# Patient Record
Sex: FEMALE | Race: WHITE | NOT HISPANIC OR LATINO
[De-identification: names, ages, dates, MRNs, and addresses within clinical notes are randomized per-mention and may not be internally consistent; named-entity substitution may affect disease eponyms.]

---

## 2021-05-07 ENCOUNTER — APPOINTMENT (OUTPATIENT)
Dept: PSYCHIATRY | Facility: CLINIC | Age: 31
End: 2021-05-07

## 2021-05-07 ENCOUNTER — OUTPATIENT (OUTPATIENT)
Dept: OUTPATIENT SERVICES | Facility: HOSPITAL | Age: 31
LOS: 1 days | Discharge: HOME | End: 2021-05-07

## 2021-05-07 PROBLEM — Z00.00 ENCOUNTER FOR PREVENTIVE HEALTH EXAMINATION: Status: ACTIVE | Noted: 2021-05-07

## 2021-06-01 ENCOUNTER — APPOINTMENT (OUTPATIENT)
Dept: PSYCHIATRY | Facility: CLINIC | Age: 31
End: 2021-06-01

## 2021-06-01 ENCOUNTER — OUTPATIENT (OUTPATIENT)
Dept: OUTPATIENT SERVICES | Facility: HOSPITAL | Age: 31
LOS: 1 days | Discharge: HOME | End: 2021-06-01

## 2021-06-01 DIAGNOSIS — F33.1 MAJOR DEPRESSIVE DISORDER, RECURRENT, MODERATE: ICD-10-CM

## 2021-06-14 ENCOUNTER — APPOINTMENT (OUTPATIENT)
Dept: PSYCHIATRY | Facility: CLINIC | Age: 31
End: 2021-06-14

## 2021-06-24 ENCOUNTER — APPOINTMENT (OUTPATIENT)
Dept: PSYCHIATRY | Facility: CLINIC | Age: 31
End: 2021-06-24

## 2021-11-15 ENCOUNTER — OUTPATIENT (OUTPATIENT)
Dept: OUTPATIENT SERVICES | Facility: HOSPITAL | Age: 31
LOS: 1 days | Discharge: HOME | End: 2021-11-15

## 2021-11-15 ENCOUNTER — APPOINTMENT (OUTPATIENT)
Dept: PSYCHIATRY | Facility: CLINIC | Age: 31
End: 2021-11-15
Payer: MEDICAID

## 2021-11-15 DIAGNOSIS — F33.1 MAJOR DEPRESSIVE DISORDER, RECURRENT, MODERATE: ICD-10-CM

## 2021-11-15 PROCEDURE — 99204 OFFICE O/P NEW MOD 45 MIN: CPT | Mod: 95

## 2022-01-14 ENCOUNTER — OUTPATIENT (OUTPATIENT)
Dept: OUTPATIENT SERVICES | Facility: HOSPITAL | Age: 32
LOS: 1 days | Discharge: HOME | End: 2022-01-14

## 2022-01-14 ENCOUNTER — APPOINTMENT (OUTPATIENT)
Dept: PSYCHIATRY | Facility: CLINIC | Age: 32
End: 2022-01-14
Payer: MEDICAID

## 2022-01-14 DIAGNOSIS — F33.1 MAJOR DEPRESSIVE DISORDER, RECURRENT, MODERATE: ICD-10-CM

## 2022-01-14 PROCEDURE — 99213 OFFICE O/P EST LOW 20 MIN: CPT | Mod: 95

## 2022-03-09 ENCOUNTER — APPOINTMENT (OUTPATIENT)
Dept: PSYCHIATRY | Facility: CLINIC | Age: 32
End: 2022-03-09

## 2022-05-23 ENCOUNTER — APPOINTMENT (OUTPATIENT)
Dept: PSYCHIATRY | Facility: CLINIC | Age: 32
End: 2022-05-23

## 2022-05-23 ENCOUNTER — OUTPATIENT (OUTPATIENT)
Dept: OUTPATIENT SERVICES | Facility: HOSPITAL | Age: 32
LOS: 1 days | Discharge: HOME | End: 2022-05-23

## 2022-05-23 DIAGNOSIS — F33.42 MAJOR DEPRESSIVE DISORDER, RECURRENT, IN FULL REMISSION: ICD-10-CM

## 2022-05-23 DIAGNOSIS — F41.1 GENERALIZED ANXIETY DISORDER: ICD-10-CM

## 2022-05-23 PROCEDURE — 99213 OFFICE O/P EST LOW 20 MIN: CPT | Mod: 95

## 2022-08-09 ENCOUNTER — OUTPATIENT (OUTPATIENT)
Dept: OUTPATIENT SERVICES | Facility: HOSPITAL | Age: 32
LOS: 1 days | Discharge: HOME | End: 2022-08-09

## 2022-08-09 ENCOUNTER — APPOINTMENT (OUTPATIENT)
Dept: PSYCHIATRY | Facility: CLINIC | Age: 32
End: 2022-08-09

## 2022-08-09 DIAGNOSIS — F33.42 MAJOR DEPRESSIVE DISORDER, RECURRENT, IN FULL REMISSION: ICD-10-CM

## 2022-08-09 DIAGNOSIS — F41.1 GENERALIZED ANXIETY DISORDER: ICD-10-CM

## 2022-08-09 PROCEDURE — 99213 OFFICE O/P EST LOW 20 MIN: CPT | Mod: 95

## 2022-10-28 ENCOUNTER — NON-APPOINTMENT (OUTPATIENT)
Age: 32
End: 2022-10-28

## 2022-10-28 NOTE — DISCUSSION/SUMMARY
[FreeTextEntry1] : Reached out to patient , to inform her that this writer is leaving the clinic . PAtient is stable, highly functioning and doing well in terms of her anxiety and depressive symptoms. Refills were sent. \par  PAtient will be transferred to another Russian speaking MD

## 2022-12-05 ENCOUNTER — APPOINTMENT (OUTPATIENT)
Dept: PSYCHIATRY | Facility: CLINIC | Age: 32
End: 2022-12-05

## 2022-12-05 NOTE — DISCUSSION/SUMMARY
[FreeTextEntry1] : Pt is a no show. I called her several times and left 2 VM. Pt called our  prior  to her appt and requested a phone session (though she never responded to my calls)

## 2022-12-15 ENCOUNTER — APPOINTMENT (OUTPATIENT)
Dept: PSYCHIATRY | Facility: CLINIC | Age: 32
End: 2022-12-15

## 2022-12-20 ENCOUNTER — OUTPATIENT (OUTPATIENT)
Dept: OUTPATIENT SERVICES | Facility: HOSPITAL | Age: 32
LOS: 1 days | Discharge: HOME | End: 2022-12-20

## 2022-12-20 ENCOUNTER — APPOINTMENT (OUTPATIENT)
Dept: PSYCHIATRY | Facility: CLINIC | Age: 32
End: 2022-12-20

## 2022-12-20 ENCOUNTER — TRANSCRIPTION ENCOUNTER (OUTPATIENT)
Age: 32
End: 2022-12-20

## 2022-12-20 DIAGNOSIS — F41.1 GENERALIZED ANXIETY DISORDER: ICD-10-CM

## 2022-12-20 DIAGNOSIS — F33.42 MAJOR DEPRESSIVE DISORDER, RECURRENT, IN FULL REMISSION: ICD-10-CM

## 2022-12-20 PROCEDURE — 99214 OFFICE O/P EST MOD 30 MIN: CPT | Mod: 95

## 2022-12-20 NOTE — PHYSICAL EXAM
[Well groomed] : well groomed [Cooperative] : cooperative [Anxious] : anxious [Full] : full [Clear] : clear [Linear/Goal Directed] : linear/goal directed [None] : none [None Reported] : none reported [Average] : average [WNL] : within normal limits

## 2022-12-20 NOTE — CURRENT PSYCHIATRIC SYMPTOMS
[Depressed Mood] : depressed mood [Anhedonia] : anhedonia [Guilt] : not feeling guilty [Decreased Concentration] : decreased concentrating ability [Hyperphagia] : no hyperphagia [Insomnia] : no insomnia disorder [Hypersomnia] : no ~T hypersomnia [Psychomotor Retardation] : no psychomotor retardation [Anorexia] : no anorexia [Euphoria] : no euphoria [Highly Irritable] : no high irritability [Increased Activity] : no increased in activity [Distractibility] : not distracted [Talkativeness] : no talkativeness [Grandeur] : no feelings of grandeur [Buying Sprees] : no buying sprees [Hypersexuality] : denied hypersexuality [Dec Need For Sleep] : no decreased need for sleep [Delusions] : no ~T delusions [Hallucination Auditory] : no auditory hallucinations [Thought Disorder] : ~T a thought disorder was not noted [Excessive Worry] : excessive worry [Ruminations] : ruminations [Obsessions] : no obsessions [Re-experiencing] : no re-experiencing [Restlessness] : restlessness [Hypochondriasis] : no hypochondriasis [Panic] : panic  [Recent Onset] : no recent onset of confusion [Fluctuating Course] : no fluctuating course [Reversed sleep-wake] : no reversed sleep- wake [Inattentive] : not nattentive [Tremor] : ~T no ~M tremor was seen [Anxiety] : no anxiety [Hallucination Visual] : no visual hallucinations [Hallucination Olfactory] : no olfactory hallucinations [Nausea] : no nausea [Hallucination Tactile] : no tactile hallucinations [Hallucination Gustatory] : no gustatory hallucinations [Diaphoresis] : showed no ~M diaphoresis [Vomiting] : no vomiting [Yawning] : no yawning [Mydriasis] : showed no ~M mydriasis [Gastrointestinal Sxs] : no ~T gastrointestinal symptoms [Piloerection] : the hair did not demonstrate piloerection [Rhinorrhea] : no ~M rhinorrhea discharge was seen

## 2022-12-20 NOTE — PAST MEDICAL HISTORY
[FreeTextEntry1] : Patient is a 30 year old, domiciled, employed, , female from [facility] who reports history of depression and anxiety dating back to age 11.  At this time, she admits to depressive characterized by uncontrollable tearful episodes and decreased appetite.  Parents sought outpatient mental health treatment with a psychologist for a year with positive effect.  She did not receive additional treatment until age 18 when she was psychiatrically hospitalized due to anxiety and depressive symptoms in context of Bird flu pandemic.  Did not receive any other treatment until 2017 when she received outpatient treatment at [facility].  Denied history of suicide/homicide attempts.  Acknowledged 2 to 3 incidents of self-injurious behavior by way of cutting wrist at age [age].  Also denied history of psychosis and acts of violence.

## 2022-12-20 NOTE — SOCIAL HISTORY
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 \par [X] No \par [ ] Yes - Details: [ ]  \par \par  \par \par Legal Involvement history  \par Does the individual have a history of or current involvement with the legal system?  \par [X] No \par [ ] Yes - Details: [ ]  \par \par  \par \par  Services  \par Have you ever served in the ?  \par [ X ] No \par [ ] Yes - Details: [ ]  \par \par  \par Spiritual Assessment Tool - FICA \par F. What is your gigi or belief? Patient believes in energy.  “Something exists, but we don’t know” \par \par Do you consider yourself spiritual or Sabianism? Spiritual  \par Is there something you believe in that gives meaning to your life? "Belief in success” \par \par I: Is it important in your life? Yes, very \par What influence does it have on how you take care of yourself? Drives her to engage in activities to be successful   \par \par How have your beliefs influenced your behavior during this illness? What role do your beliefs play in regaining your health? She does not believe it has  \par \par C. Are you part of a spiritual or Sabianism community? No  \par Is this of support to you and how?  N/A \par \par Is there a person or group of people you really love or who are really important to you? Boyfriend and family  \par \par H. We have been discussing your belief and supports. What else gives you internal support?  “When someone loves me unconditionally” \par \par What are your sources of hope, strength, comfort and peace?  Love from others \par \par What do you hold on to during difficult times? Thoughts that things can be better  \par \par What sustains you and keeps you going? Hope  \par \par A. How would you like me, your healthcare provider, to address these issues in your healthcare? Does not want it addressed  \par \par   [Yes] : yes [None Known] : none known [No Known Use] : no known use [TextBox_7] : Patient reports she drinks 1-2 days a week where she consumes 1-2 glasses of wine with friends.  Denied problematic  [TextBox_9] : Denied [TextBox_11] : Denied

## 2022-12-20 NOTE — DISCUSSION/SUMMARY
[FreeTextEntry1] : This was our 1st session after dr. Orellana's departure.\par Solo/tele visit.\par Pt says  that she is doing fine. She takes 12.5 mg of lexapro and doesn't take klonopin.\par Pt is  and currently dating a skinny (for 2 m). She doesn't use any protection and wants to get pregnant (d/k when it will happen). Pt asked a lot of questions about her meds and pregnancy.\par Risks and benefits of lexapro were d/w pt in details. She will continue lexapro for as long as it will take to get pregnant.\par She will possibly stop it when she will get pregnant and will continue only with therapy and close observation. Pt is currently in therapy with a private therapist. I told her that she will need to switch to our therapist when she will stop her meds or we will have to close her case. Pt is agreeable. Denies SI/HI/AH/PI. Sleep and appetite are OK. Pt got Green card.\par \par Pt is 31 yo F, domiciled with roommates,  (was  for 3 y), currently dating, no kids, employed (film industry), came from [facility] at age 25 (parents are in [facility]), with h/o depression and anxiety, 1 prior voluntary IPP at age 18 (in [facility]), no prior SA, no GEORGE\par \par 1. Next appt in 1m tele\par 2. Continue 12.5mg of lexapro\par 3. No labs in the chart (there were no meds too for some reason). She says that she had labs done 1 y ago at her PCP and the results were faxed to dr. Orellana. Refer for labs \par 4. Pt didn't tell me, but dr. Orellana writes about h/o SIB. Get more information\par 5. Pt wants to get pregnant

## 2022-12-20 NOTE — REASON FOR VISIT
[Follow-Up Visit] : a follow-up visit [Home] : at home, [unfilled] , at the time of the visit. [Medical Office: ([facility])___] : at the medical office located in  [Evaluation] : evaluation of [FreeTextEntry1] : Depression and anxiety  [FreeTextEntry2] : Self

## 2022-12-20 NOTE — HISTORY OF PRESENT ILLNESS
[FreeTextEntry3] : This was our 1st session after dr. Orellana's departure.\par Solo/tele visit.\par Pt says  that she is doing fine. She takes 12.5 mg of lexapro and doesn't take klonopin.\par Pt is  and currently dating a skinny (for 2 m). She doesn't use any protection and wants to get pregnant (d/k when it will happen). Pt asked a lot of questions about her meds and pregnancy.\par Risks and benefits of lexapro were d/w pt in details. She will continue lexapro for as long as it will take to get pregnant.\par She will possibly stop it when she will get pregnant and will continue only with therapy and close observation. Pt is currently in therapy with a private therapist. I told her that she will need to switch to our therapist when she will stop her meds or we will have to close her case. Pt is agreeable. Denies SI/HI/AH/PI. Sleep and appetite are OK. Pt got Green card. [de-identified] : This was our 1st session after dr. Orellana's departure.\par Solo/tele visit.\par Pt says  that she is doing fine. She takes 12.5 mg of lexapro and doesn't take klonopin.\par Pt is  and currently dating a skinny (for 2 m). She doesn't use any protection and wants to get pregnant (d/k when it will happen). Pt asked a lot of questions about her meds and pregnancy.\par Risks and benefits of lexapro were d/w pt in details. She will continue lexapro for as long as it will take to get pregnant.\par She will possibly stop it when she will get pregnant and will continue only with therapy and close observation. Pt is currently in therapy with a private therapist. I told her that she will need to switch to our therapist when she will stop her meds or we will have to close her case. Pt is agreeable. Denies SI/HI/AH/PI. Sleep and appetite are OK. Pt got Green card. [Yes] : yes [No] : no [TextBox_32] : Patient reports times where she wished she was dead and wanted it to happen accidentally.  However, she denied intent to harm (denied ability "to do it myself").   [Yes > 3 months ago] : yes, > 3 months ago [Agitation/ severe anxiety] : agitation/severe anxiety [Anhedonia] : anhedonia [Recent loss] : recent loss [Other___] : [unfilled] [Identifies reasons for living] : identifies reasons for living [Future oriented] : future oriented [Fear of death or dying due to pain/suffering] : fear of death or dying due to pain/suffering [TextBox_52] : Patient reports at age [age] she engaged in self-injurious behavior of cutting wrist 2-3 times.  Denied intent to kill herself, but to only hurt and release stress [None Known] : none known [Engagement in treatment] : engagement in treatment

## 2023-03-14 ENCOUNTER — APPOINTMENT (OUTPATIENT)
Dept: PSYCHIATRY | Facility: CLINIC | Age: 33
End: 2023-03-14
Payer: MEDICAID

## 2023-03-14 ENCOUNTER — OUTPATIENT (OUTPATIENT)
Dept: OUTPATIENT SERVICES | Facility: HOSPITAL | Age: 33
LOS: 1 days | End: 2023-03-14
Payer: MEDICAID

## 2023-03-14 DIAGNOSIS — F33.1 MAJOR DEPRESSIVE DISORDER, RECURRENT, MODERATE: ICD-10-CM

## 2023-03-14 DIAGNOSIS — F41.1 GENERALIZED ANXIETY DISORDER: ICD-10-CM

## 2023-03-14 PROCEDURE — 99214 OFFICE O/P EST MOD 30 MIN: CPT | Mod: 95

## 2023-03-14 NOTE — DISCUSSION/SUMMARY
[FreeTextEntry1] : Solo/tele visit.\par Pt says  that she is doing fine now because she recently came back from [facility]. However, she tried to decrease lexapro to 5-10mg and felt very anxious. Pt started feeling better after she increased lexapro back to 12.5 mg. She feels that this dose is optimal for her (a higher dose causes severe drowsiness). She takes 12.5 mg of lexapro and doesn't take klonopin.\par Pt is  and currently dating a skinny (for 3 m). 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 Pt again asked a lot of questions about her meds and pregnancy. She is concerned that she tried to wean herself off lexapro and felt so bad. We discussed treatment options for her in the event that she becomes pregnant (therapy was one of them). Risks of meds for a fetus were discussed in details.\par Risks and benefits of lexapro were d/w pt too in details. She will continue lexapro for as long as it will take to get pregnant, and then she will decide if she wants to continue or  to stop it.\par She will possibly stop it when she will get pregnant and will continue only with therapy and close observation. Pt is currently in therapy with a private therapist. I told her that she will need to switch to our therapist when she will stop her meds or we will have to close her case. Pt is agreeable. Denies SI/HI/AH/PI. Sleep and appetite are OK. 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 Next appt in 1m tele\par 2. Continue 12.5mg of lexapro\par 3. No labs in the chart (there were no meds too for some reason). She says that she had labs done 1 y ago at her PCP and the results were faxed to dr. Orellana. Refer for labs \par 4. Pt didn't tell me, but dr. Orellana writes about h/o SIB. Get more information\par 5. Pt wants to get pregnant and her treatment options were d/w her in details (she tried to decrease lexapro but felt very anxious).\par 6. How her trip to [facility] went (to film a documentary)

## 2023-03-14 NOTE — CURRENT PSYCHIATRIC SYMPTOMS
[Anhedonia] : anhedonia [Depressed Mood] : depressed mood [Guilt] : not feeling guilty [Decreased Concentration] : decreased concentrating ability [Hyperphagia] : no hyperphagia [Insomnia] : no insomnia disorder [Hypersomnia] : no ~T hypersomnia [Psychomotor Retardation] : no psychomotor retardation [Anorexia] : no anorexia [Euphoria] : no euphoria [Highly Irritable] : no high irritability [Increased Activity] : no increased in activity [Distractibility] : not distracted [Talkativeness] : no talkativeness [Grandeur] : no feelings of grandeur [Buying Sprees] : no buying sprees [Hypersexuality] : denied hypersexuality [Dec Need For Sleep] : no decreased need for sleep [Delusions] : no ~T delusions [Hallucination Auditory] : no auditory hallucinations [Thought Disorder] : ~T a thought disorder was not noted [Excessive Worry] : excessive worry [Ruminations] : ruminations [Obsessions] : no obsessions [Re-experiencing] : no re-experiencing [Restlessness] : restlessness [Hypochondriasis] : no hypochondriasis [Panic] : panic  [Recent Onset] : no recent onset of confusion [Fluctuating Course] : no fluctuating course [Reversed sleep-wake] : no reversed sleep- wake [Inattentive] : not nattentive [Tremor] : ~T no ~M tremor was seen [Anxiety] : no anxiety [Hallucination Visual] : no visual hallucinations [Hallucination Olfactory] : no olfactory hallucinations [Nausea] : no nausea [Hallucination Tactile] : no tactile hallucinations [Hallucination Gustatory] : no gustatory hallucinations [Diaphoresis] : showed no ~M diaphoresis [Vomiting] : no vomiting [Yawning] : no yawning [Mydriasis] : showed no ~M mydriasis [Gastrointestinal Sxs] : no ~T gastrointestinal symptoms [Piloerection] : the hair did not demonstrate piloerection [Rhinorrhea] : no ~M rhinorrhea discharge was seen

## 2023-03-14 NOTE — SOCIAL HISTORY
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 \par [X] No \par [ ] Yes - Details: [ ]  \par \par  \par \par Legal Involvement history  \par Does the individual have a history of or current involvement with the legal system?  \par [X] No \par [ ] Yes - Details: [ ]  \par \par  \par \par  Services  \par Have you ever served in the ?  \par [ X ] No \par [ ] Yes - Details: [ ]  \par \par  \par Spiritual Assessment Tool - FICA \par F. What is your gigi or belief? Patient believes in energy.  “Something exists, but we don’t know” \par \par Do you consider yourself spiritual or Gnosticism? Spiritual  \par Is there something you believe in that gives meaning to your life? "Belief in success” \par \par I: Is it important in your life? Yes, very \par What influence does it have on how you take care of yourself? Drives her to engage in activities to be successful   \par \par How have your beliefs influenced your behavior during this illness? What role do your beliefs play in regaining your health? She does not believe it has  \par \par C. Are you part of a spiritual or Gnosticism community? No  \par Is this of support to you and how?  N/A \par \par Is there a person or group of people you really love or who are really important to you? Boyfriend and family  \par \par H. We have been discussing your belief and supports. What else gives you internal support?  “When someone loves me unconditionally” \par \par What are your sources of hope, strength, comfort and peace?  Love from others \par \par What do you hold on to during difficult times? Thoughts that things can be better  \par \par What sustains you and keeps you going? Hope  \par \par A. How would you like me, your healthcare provider, to address these issues in your healthcare? Does not want it addressed  \par \par   [Yes] : yes [None Known] : none known [No Known Use] : no known use [TextBox_7] : Patient reports she drinks 1-2 days a week where she consumes 1-2 glasses of wine with friends.  Denied problematic  [TextBox_9] : Denied [TextBox_11] : Denied

## 2023-03-14 NOTE — REASON FOR VISIT
[Follow-Up Visit] : a follow-up visit [Home] : at home, [unfilled] , at the time of the visit. [Medical Office: ([facility])___] : at the medical office located in  [Evaluation] : evaluation of [FreeTextEntry1] : Depression and anxiety  [FreeTextEntry2] : Self

## 2023-03-14 NOTE — PAST MEDICAL HISTORY
[FreeTextEntry1] : Patient is a 30 year old, domiciled, employed, , female from [facility] who reports history of depression and anxiety dating back to age 11.  At this time, she admits to depressive characterized by uncontrollable tearful episodes and decreased appetite.  Parents sought outpatient mental health treatment with a psychologist for a year with positive effect.  She did not receive additional treatment until age 18 when she was psychiatrically hospitalized due to anxiety and depressive symptoms in context of Bird flu pandemic.  Did not receive any other treatment until 2017 when she received outpatient treatment at [facility].  Denied history of suicide/homicide attempts.  Acknowledged 2 to 3 incidents of self-injurious behavior by way of cutting wrist at age [age].  Also denied history of psychosis and acts of violence.

## 2023-03-14 NOTE — HISTORY OF PRESENT ILLNESS
[FreeTextEntry3] : Solo/tele visit.\par Pt says  that she is doing fine now because she recently came back from [facility]. However, she tried to decrease lexapro to 5-10mg and felt very anxious. Pt started feeling better after she increased lexapro back to 12.5 mg. She feels that this dose is optimal for her (a higher dose causes severe drowsiness). She takes 12.5 mg of lexapro and doesn't take klonopin.\par Pt is  and currently dating a skinny (for 3 m). 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 Pt again asked a lot of questions about her meds and pregnancy. She is concerned that she tried to wean herself off lexapro and felt so bad. We discussed treatment options for her in the event that she becomes pregnant (therapy was one of them). Risks of meds for a fetus were discussed in details.\par Risks and benefits of lexapro were d/w pt too in details. She will continue lexapro for as long as it will take to get pregnant, and then she will decide if she wants to continue or  to stop it.\par She will possibly stop it when she will get pregnant and will continue only with therapy and close observation. Pt is currently in therapy with a private therapist. I told her that she will need to switch to our therapist when she will stop her meds or we will have to close her case. Pt is agreeable. Denies SI/HI/AH/PI. Sleep and appetite are OK. Pt got Green card. [de-identified] : This was our 1st session after dr. Orellana's departure.\par Solo/tele visit.\par Pt says  that she is doing fine. She takes 12.5 mg of lexapro and doesn't take klonopin.\par Pt is  and currently dating a skinny (for 2 m). She doesn't use any protection and wants to get pregnant (d/k when it will happen). Pt asked a lot of questions about her meds and pregnancy.\par Risks and benefits of lexapro were d/w pt in details. She will continue lexapro for as long as it will take to get pregnant.\par She will possibly stop it when she will get pregnant and will continue only with therapy and close observation. Pt is currently in therapy with a private therapist. I told her that she will need to switch to our therapist when she will stop her meds or we will have to close her case. Pt is agreeable. Denies SI/HI/AH/PI. Sleep and appetite are OK. Pt got Green card. [Yes] : yes [No] : no [TextBox_32] : Patient reports times where she wished she was dead and wanted it to happen accidentally.  However, she denied intent to harm (denied ability "to do it myself").   [Yes > 3 months ago] : yes, > 3 months ago [Agitation/ severe anxiety] : agitation/severe anxiety [Anhedonia] : anhedonia [Recent loss] : recent loss [Other___] : [unfilled] [Identifies reasons for living] : identifies reasons for living [Future oriented] : future oriented [Fear of death or dying due to pain/suffering] : fear of death or dying due to pain/suffering [TextBox_52] : Patient reports at age [age] she engaged in self-injurious behavior of cutting wrist 2-3 times.  Denied intent to kill herself, but to only hurt and release stress [None Known] : none known [Engagement in treatment] : engagement in treatment

## 2023-03-15 DIAGNOSIS — F33.1 MAJOR DEPRESSIVE DISORDER, RECURRENT, MODERATE: ICD-10-CM

## 2023-03-15 DIAGNOSIS — F41.1 GENERALIZED ANXIETY DISORDER: ICD-10-CM

## 2023-04-20 ENCOUNTER — APPOINTMENT (OUTPATIENT)
Dept: PSYCHIATRY | Facility: CLINIC | Age: 33
End: 2023-04-20
Payer: MEDICAID

## 2023-04-20 ENCOUNTER — OUTPATIENT (OUTPATIENT)
Dept: OUTPATIENT SERVICES | Facility: HOSPITAL | Age: 33
LOS: 1 days | End: 2023-04-20
Payer: MEDICAID

## 2023-04-20 DIAGNOSIS — F33.1 MAJOR DEPRESSIVE DISORDER, RECURRENT, MODERATE: ICD-10-CM

## 2023-04-20 PROCEDURE — 99214 OFFICE O/P EST MOD 30 MIN: CPT | Mod: 95

## 2023-04-20 NOTE — CURRENT PSYCHIATRIC SYMPTOMS
[Depressed Mood] : depressed mood [Anhedonia] : anhedonia [Guilt] : not feeling guilty [Decreased Concentration] : decreased concentrating ability [Hyperphagia] : no hyperphagia [Insomnia] : no insomnia disorder [Hypersomnia] : no ~T hypersomnia [Psychomotor Retardation] : no psychomotor retardation [Anorexia] : no anorexia [Euphoria] : no euphoria [Highly Irritable] : no high irritability [Increased Activity] : no increased in activity [Distractibility] : not distracted [Talkativeness] : no talkativeness [Grandeur] : no feelings of grandeur [Buying Sprees] : no buying sprees [Hypersexuality] : denied hypersexuality [Dec Need For Sleep] : no decreased need for sleep [Delusions] : no ~T delusions [Hallucination Auditory] : no auditory hallucinations [Thought Disorder] : ~T a thought disorder was not noted [Excessive Worry] : excessive worry [Ruminations] : ruminations [Obsessions] : no obsessions [Re-experiencing] : no re-experiencing [Restlessness] : restlessness [Hypochondriasis] : no hypochondriasis [Panic] : panic  [Recent Onset] : no recent onset of confusion [Fluctuating Course] : no fluctuating course [Inattentive] : not nattentive [Reversed sleep-wake] : no reversed sleep- wake [Tremor] : ~T no ~M tremor was seen [Anxiety] : no anxiety [Hallucination Visual] : no visual hallucinations [Hallucination Olfactory] : no olfactory hallucinations [Nausea] : no nausea [Hallucination Tactile] : no tactile hallucinations [Hallucination Gustatory] : no gustatory hallucinations [Diaphoresis] : showed no ~M diaphoresis [Vomiting] : no vomiting [Yawning] : no yawning [Gastrointestinal Sxs] : no ~T gastrointestinal symptoms [Mydriasis] : showed no ~M mydriasis [Piloerection] : the hair did not demonstrate piloerection [Rhinorrhea] : no ~M rhinorrhea discharge was seen

## 2023-04-20 NOTE — DISCUSSION/SUMMARY
[FreeTextEntry1] : Solo/tele visit.\rafa Pt says  that she is doing fine now because she recently came back from [facility] and also because  she is very busy. Works a lot.. She tried to decrease lexapro to 5-10mg and felt very anxious. Pt started feeling better after she increased lexapro back to 12.5 mg. She feels that this dose is optimal for her (a higher dose causes severe drowsiness). She takes 12.5 mg of lexapro and doesn't take klonopin.\rafa Pt continues to try to get pregnant. Her OB said that it can take up to 1 h. \rafa Pt is  and currently dating a skinny (for 3 m). \par Pt doesn't use any protection and wants to get pregnant (d/k when it will happen). Pt again asked a lot of questions about her meds and pregnancy. She is concerned that she tried to wean herself off lexapro and felt so bad. We discussed treatment options for her in the event that she becomes pregnant (therapy was one of them). Risks of meds for a fetus were discussed in details.\par She leans toward continuing lexapro during her future pregnancy.\rafa  Pt is currently in therapy with a private therapist. I told her that she will need to switch to our therapist when she will stop her meds or we will have to close her case. I also told her to ask her PCP if he agrees to continue to prescribe lexapro. Pt is agreeable. Denies SI/HI/AH/PI. Sleep and appetite are OK.\rafa Pt had labs done 2 weeks ago and she will see her PCP in 1 week. 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 Next appt in 2m tele\par 2. Continue 12.5mg of lexapro\par 3. Labs results (PCP. I gave our fax #)\par 4. Did she speak to her PCP regarding having lexapro prescribed.\par 5. Pt wants to get pregnant and her treatment options were d/w her in details (she tried to decrease lexapro but felt very anxious).\par

## 2023-04-20 NOTE — PAST MEDICAL HISTORY
[FreeTextEntry1] : Patient is a 30 year old, domiciled, employed, , female from [facility] who reports history of depression and anxiety dating back to age 11.  At this time, she admits to depressive characterized by uncontrollable tearful episodes and decreased appetite.  Parents sought outpatient mental health treatment with a psychologist for a year with positive effect.  She did not receive additional treatment until age 18 when she was psychiatrically hospitalized due to anxiety and depressive symptoms in context of Bird flu pandemic.  Did not receive any other treatment until 2017 when she received outpatient treatment at [facility].  Denied history of suicide/homicide attempts.  Acknowledged 2 to 3 incidents of self-injurious behavior by way of cutting wrist at age [age].  Also denied history of psychosis and acts of violence.

## 2023-04-20 NOTE — SOCIAL HISTORY
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 \par [X] No \par [ ] Yes - Details: [ ]  \par \par  \par \par Legal Involvement history  \par Does the individual have a history of or current involvement with the legal system?  \par [X] No \par [ ] Yes - Details: [ ]  \par \par  \par \par  Services  \par Have you ever served in the ?  \par [ X ] No \par [ ] Yes - Details: [ ]  \par \par  \par Spiritual Assessment Tool - FICA \par F. What is your gigi or belief? Patient believes in energy.  “Something exists, but we don’t know” \par \par Do you consider yourself spiritual or Methodist? Spiritual  \par Is there something you believe in that gives meaning to your life? "Belief in success” \par \par I: Is it important in your life? Yes, very \par What influence does it have on how you take care of yourself? Drives her to engage in activities to be successful   \par \par How have your beliefs influenced your behavior during this illness? What role do your beliefs play in regaining your health? She does not believe it has  \par \par C. Are you part of a spiritual or Methodist community? No  \par Is this of support to you and how?  N/A \par \par Is there a person or group of people you really love or who are really important to you? Boyfriend and family  \par \par H. We have been discussing your belief and supports. What else gives you internal support?  “When someone loves me unconditionally” \par \par What are your sources of hope, strength, comfort and peace?  Love from others \par \par What do you hold on to during difficult times? Thoughts that things can be better  \par \par What sustains you and keeps you going? Hope  \par \par A. How would you like me, your healthcare provider, to address these issues in your healthcare? Does not want it addressed  \par \par   [Yes] : yes [None Known] : none known [No Known Use] : no known use [TextBox_7] : Patient reports she drinks 1-2 days a week where she consumes 1-2 glasses of wine with friends.  Denied problematic  [TextBox_9] : Denied [TextBox_11] : Denied

## 2023-04-20 NOTE — REASON FOR VISIT
[Follow-Up Visit] : a follow-up visit [Home] : at home, [unfilled] , at the time of the visit. [Medical Office: ([facility])___] : at the medical office located in  [Evaluation] : evaluation of [FreeTextEntry2] : Self [FreeTextEntry1] : Depression and anxiety

## 2023-04-20 NOTE — HISTORY OF PRESENT ILLNESS
[FreeTextEntry3] : Solo/tele visit.\rafa Pt says  that she is doing fine now because she recently came back from [facility] and also because  she is very busy. Works a lot.. She tried to decrease lexapro to 5-10mg and felt very anxious. Pt started feeling better after she increased lexapro back to 12.5 mg. She feels that this dose is optimal for her (a higher dose causes severe drowsiness). She takes 12.5 mg of lexapro and doesn't take klonopin.\rafa Pt continues to try to get pregnant. Her OB said that it can take up to 1 h. \rafa Pt is  and currently dating a skinny (for 3 m). \rafa Pt doesn't use any protection and wants to get pregnant (d/k when it will happen). Pt again asked a lot of questions about her meds and pregnancy. She is concerned that she tried to wean herself off lexapro and felt so bad. We discussed treatment options for her in the event that she becomes pregnant (therapy was one of them). Risks of meds for a fetus were discussed in details.\par She leans toward continuing lexapro during her future pregnancy.\rafa  Pt is currently in therapy with a private therapist. I told her that she will need to switch to our therapist when she will stop her meds or we will have to close her case. I also told her to ask her PCP if he agrees to continue to prescribe lexapro. Pt is agreeable. Denies SI/HI/AH/PI. Sleep and appetite are OK.\rafa Pt had labs done 2 weeks ago and she will see her PCP in 1 week. I gave her our fax # [de-identified] : This was our 1st session after dr. Orellana's departure.\par Solo/tele visit.\par Pt says  that she is doing fine. She takes 12.5 mg of lexapro and doesn't take klonopin.\par Pt is  and currently dating a skinny (for 2 m). She doesn't use any protection and wants to get pregnant (d/k when it will happen). Pt asked a lot of questions about her meds and pregnancy.\par Risks and benefits of lexapro were d/w pt in details. She will continue lexapro for as long as it will take to get pregnant.\par She will possibly stop it when she will get pregnant and will continue only with therapy and close observation. Pt is currently in therapy with a private therapist. I told her that she will need to switch to our therapist when she will stop her meds or we will have to close her case. Pt is agreeable. Denies SI/HI/AH/PI. Sleep and appetite are OK. Pt got Green card. [Yes] : yes [No] : no [TextBox_32] : Patient reports times where she wished she was dead and wanted it to happen accidentally.  However, she denied intent to harm (denied ability "to do it myself").   [Yes > 3 months ago] : yes, > 3 months ago [Agitation/ severe anxiety] : agitation/severe anxiety [Anhedonia] : anhedonia [Recent loss] : recent loss [Other___] : [unfilled] [Identifies reasons for living] : identifies reasons for living [Future oriented] : future oriented [Fear of death or dying due to pain/suffering] : fear of death or dying due to pain/suffering [TextBox_52] : Patient reports at age [age] she engaged in self-injurious behavior of cutting wrist 2-3 times.  Denied intent to kill herself, but to only hurt and release stress [None Known] : none known [Engagement in treatment] : engagement in treatment

## 2023-04-21 DIAGNOSIS — F33.1 MAJOR DEPRESSIVE DISORDER, RECURRENT, MODERATE: ICD-10-CM

## 2023-05-18 ENCOUNTER — NON-APPOINTMENT (OUTPATIENT)
Age: 33
End: 2023-05-18

## 2023-05-24 ENCOUNTER — NON-APPOINTMENT (OUTPATIENT)
Age: 33
End: 2023-05-24

## 2023-05-24 NOTE — DISCUSSION/SUMMARY
[Plan Review] : Plan Review [Able to manage surrounding demands and opportunities] : able to manage surrounding demands and opportunities [Able to set and pursue goals] : able to set and pursue goals [Attempting to realize their potential] : Attempting to realize their potential [Cognitively intact] : cognitively intact [Creative] : creative [Intelligent] : intelligent [Motivated to participate in treatment] : motivated to participate in treatment [Motivated to maintain or improve physical health] : motivated to maintain or improve physical health [FreeTextEntry1] : 01/15/23 [FreeTextEntry2] : 01/15/24 [FreeTextEntry5] : Pt is in need of continuing  maintenance treatment for her DAMEON and recurrent MDD to prevent relapse \par \par Continue to experience therapeutic gains due to compliance with visits and medications \par  [Mental Health] : Mental Health [Continued - Progress made] : Continued - Progress made: [every ___ weeks] : every [unfilled] weeks [Improvement in symptoms as evidenced by:] : Improvement in symptoms as evidenced by: [Attainment of higher level of functioning as evidenced by:] : Attainment of higher level of functioning as evidenced by: [Treatment is no longer medically necessary as evidenced by:] : Treatment is no longer medically necessary as evidenced by: [Yes] : Yes [Psychiatric Provider/Prescriber] : Psychiatric Provider/Prescriber [FreeTextEntry4] : Not to feel depressed and anxious [de-identified] : pt is stable on 12.5mg of lexapro [de-identified] : compliance with meds and F/U [de-identified] : no symptoms of anxiety and depression [de-identified] : pt is in remission [Potential impact of patient’s physical health conditions on psychiatric care?] : Potential impact of patient’s physical health conditions on psychiatric care: No [Does patient require any additional health services or referrals?] : Does patient require any additional health services or referrals: No

## 2023-05-30 ENCOUNTER — APPOINTMENT (OUTPATIENT)
Dept: PSYCHIATRY | Facility: CLINIC | Age: 33
End: 2023-05-30
Payer: MEDICAID

## 2023-05-30 ENCOUNTER — OUTPATIENT (OUTPATIENT)
Dept: OUTPATIENT SERVICES | Facility: HOSPITAL | Age: 33
LOS: 1 days | End: 2023-05-30
Payer: MEDICAID

## 2023-05-30 DIAGNOSIS — F33.1 MAJOR DEPRESSIVE DISORDER, RECURRENT, MODERATE: ICD-10-CM

## 2023-05-30 PROCEDURE — 99214 OFFICE O/P EST MOD 30 MIN: CPT | Mod: 95

## 2023-05-30 RX ORDER — ESCITALOPRAM OXALATE 5 MG/1
5 TABLET ORAL DAILY
Qty: 90 | Refills: 2 | Status: ACTIVE | COMMUNITY
Start: 2022-12-20 | End: 1900-01-01

## 2023-05-30 RX ORDER — ESCITALOPRAM OXALATE 10 MG/1
10 TABLET ORAL
Qty: 90 | Refills: 2 | Status: ACTIVE | COMMUNITY
Start: 2022-12-20 | End: 1900-01-01

## 2023-05-30 NOTE — HISTORY OF PRESENT ILLNESS
[FreeTextEntry3] : Solo/tele visit.\rafa Pt is 8 week pregnant. \par  She tried to decrease lexapro to 5-10mg and felt very anxious. Pt started feeling better after she increased lexapro back to 12.5 mg. She decided to continue with meds.\rafa Pt also moves to [facility] with her 49 yo BF. She will see me 1 more time in 1 m and then her case will be closed. \rafa  Pt again asked a lot of questions about her meds and pregnancy. She is concerned that she tried to wean herself off lexapro and felt so bad. Risks of meds for a fetus were discussed in details.\rafa  Pt is currently in therapy with a private therapist. Denies SI/HI/AH/PI. Sleep and appetite are OK.\rafa Pt had labs done 2 weeks ago and she will see her PCP in 1 week. I gave her our fax #\par Education and supportive therapy were provided [de-identified] : This was our 1st session after dr. Orellana's departure.\par Solo/tele visit.\par Pt says  that she is doing fine. She takes 12.5 mg of lexapro and doesn't take klonopin.\par Pt is  and currently dating a skinny (for 2 m). She doesn't use any protection and wants to get pregnant (d/k when it will happen). Pt asked a lot of questions about her meds and pregnancy.\par Risks and benefits of lexapro were d/w pt in details. She will continue lexapro for as long as it will take to get pregnant.\par She will possibly stop it when she will get pregnant and will continue only with therapy and close observation. Pt is currently in therapy with a private therapist. I told her that she will need to switch to our therapist when she will stop her meds or we will have to close her case. Pt is agreeable. Denies SI/HI/AH/PI. Sleep and appetite are OK. Pt got Green card. [Yes] : yes [No] : no [TextBox_32] : Patient reports times where she wished she was dead and wanted it to happen accidentally.  However, she denied intent to harm (denied ability "to do it myself").   [Yes > 3 months ago] : yes, > 3 months ago [Agitation/ severe anxiety] : agitation/severe anxiety [Anhedonia] : anhedonia [Recent loss] : recent loss [Other___] : [unfilled] [Identifies reasons for living] : identifies reasons for living [Future oriented] : future oriented [Fear of death or dying due to pain/suffering] : fear of death or dying due to pain/suffering [TextBox_52] : Patient reports at age [age] she engaged in self-injurious behavior of cutting wrist 2-3 times.  Denied intent to kill herself, but to only hurt and release stress [None Known] : none known [Engagement in treatment] : engagement in treatment

## 2023-05-30 NOTE — PHYSICAL EXAM
[Well groomed] : well groomed [Cooperative] : cooperative [Anxious] : anxious [Full] : full [Clear] : clear [Linear/Goal Directed] : linear/goal directed [None] : none [None Reported] : none reported [Average] : average [WNL] : within normal limits [FreeTextEntry8] : OK

## 2023-05-30 NOTE — PAST MEDICAL HISTORY
[FreeTextEntry1] : Patient is a 30 year old, domiciled, employed, , female from [facility] who reports history of depression and anxiety dating back to age 11.  At this time, she admits to depressive characterized by uncontrollable tearful episodes and decreased appetite.  Parents sought outpatient mental health treatment with a psychologist for a year with positive effect.  She did not receive additional treatment until age 18 when she was psychiatrically hospitalized due to anxiety and depressive symptoms in context of Bird flu pandemic.  Did not receive any other treatment until 2017 when she received outpatient treatment at [facility].  Denied history of suicide/homicide attempts.  Acknowledged 2 to 3 incidents of self-injurious behavior by way of cutting wrist at age [age].  Also denied history of psychosis and acts of violence.

## 2023-05-30 NOTE — REASON FOR VISIT
[Follow-Up Visit] : a follow-up visit [Home] : at home, [unfilled] , at the time of the visit. [Medical Office: ([facility])___] : at the medical office located in  [Evaluation] : evaluation of [FreeTextEntry1] : Depression and anxiety  [FreeTextEntry2] : Self

## 2023-05-30 NOTE — DISCUSSION/SUMMARY
[FreeTextEntry1] : Solo/tele visit.\par Pt is 8 week pregnant. \par  She tried to decrease lexapro to 5-10mg and felt very anxious. Pt started feeling better after she increased lexapro back to 12.5 mg. She decided to continue with meds.\par Pt also moves to [facility] with her 49 yo BF. She will see me 1 more time in 1 m and then her case will be closed. \par  Pt again asked a lot of questions about her meds and pregnancy. She is concerned that she tried to wean herself off lexapro and felt so bad. Risks of meds for a fetus were discussed in details.\par  Pt is currently in therapy with a private therapist. Denies SI/HI/AH/PI. Sleep and appetite are OK.\par Pt had labs done 2 weeks ago and she will see her PCP in 1 week. 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 Next appt in 1m tele\par 2. Continue 12.5mg of lexapro (I tried to send 90 tabs. Was she dispensed 90 tabs or 30)\par 3. Labs results (PCP. I gave our fax #)\par 4. Pt is moving to [facility] and her next appt will be her last one\par 5. How is pregnancy going (8 weeks now)\par

## 2023-05-31 DIAGNOSIS — F33.1 MAJOR DEPRESSIVE DISORDER, RECURRENT, MODERATE: ICD-10-CM

## 2023-06-22 ENCOUNTER — APPOINTMENT (OUTPATIENT)
Dept: PSYCHIATRY | Facility: CLINIC | Age: 33
End: 2023-06-22
Payer: MEDICAID

## 2023-06-22 ENCOUNTER — OUTPATIENT (OUTPATIENT)
Dept: OUTPATIENT SERVICES | Facility: HOSPITAL | Age: 33
LOS: 1 days | End: 2023-06-22
Payer: MEDICAID

## 2023-06-22 DIAGNOSIS — F33.1 MAJOR DEPRESSIVE DISORDER, RECURRENT, MODERATE: ICD-10-CM

## 2023-06-22 DIAGNOSIS — F41.0 GENERALIZED ANXIETY DISORDER: ICD-10-CM

## 2023-06-22 DIAGNOSIS — F41.1 GENERALIZED ANXIETY DISORDER: ICD-10-CM

## 2023-06-22 PROCEDURE — 99214 OFFICE O/P EST MOD 30 MIN: CPT | Mod: 95

## 2023-06-22 NOTE — PAST MEDICAL HISTORY
[FreeTextEntry1] : Patient is a 30 year old, domiciled, employed, , female from [email or web address] who reports history of depression and anxiety dating back to age 11.  At this time, she admits to depressive characterized by uncontrollable tearful episodes and decreased appetite.  Parents sought outpatient mental health treatment with a psychologist for a year with positive effect.  She did not receive additional treatment until age 18 when she was psychiatrically hospitalized due to anxiety and depressive symptoms in context of Bird flu pandemic.  Did not receive any other treatment until 2017 when she received outpatient treatment at [facility].  Denied history of suicide/homicide attempts.  Acknowledged 2 to 3 incidents of self-injurious behavior by way of cutting wrist at age [age].  Also denied history of psychosis and acts of violence.

## 2023-06-22 NOTE — SOCIAL HISTORY
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 \par [X] No \par [ ] Yes - Details: [ ]  \par \par  \par \par Legal Involvement history  \par Does the individual have a history of or current involvement with the legal system?  \par [X] No \par [ ] Yes - Details: [ ]  \par \par  \par \par  Services  \par Have you ever served in the ?  \par [ X ] No \par [ ] Yes - Details: [ ]  \par \par  \par Spiritual Assessment Tool - FICA \par F. What is your gigi or belief? Patient believes in energy.  “Something exists, but we don’t know” \par \par Do you consider yourself spiritual or Tenriism? Spiritual  \par Is there something you believe in that gives meaning to your life? "Belief in success” \par \par I: Is it important in your life? Yes, very \par What influence does it have on how you take care of yourself? Drives her to engage in activities to be successful   \par \par How have your beliefs influenced your behavior during this illness? What role do your beliefs play in regaining your health? She does not believe it has  \par \par C. Are you part of a spiritual or Tenriism community? No  \par Is this of support to you and how?  N/A \par \par Is there a person or group of people you really love or who are really important to you? Boyfriend and family  \par \par H. We have been discussing your belief and supports. What else gives you internal support?  “When someone loves me unconditionally” \par \par What are your sources of hope, strength, comfort and peace?  Love from others \par \par What do you hold on to during difficult times? Thoughts that things can be better  \par \par What sustains you and keeps you going? Hope  \par \par A. How would you like me, your healthcare provider, to address these issues in your healthcare? Does not want it addressed  \par \par   [Yes] : yes [None Known] : none known [No Known Use] : no known use [TextBox_7] : Patient reports she drinks 1-2 days a week where she consumes 1-2 glasses of wine with friends.  Denied problematic  [TextBox_9] : Denied [TextBox_11] : Denied

## 2023-06-22 NOTE — HISTORY OF PRESENT ILLNESS
[FreeTextEntry3] : Solo/tele visit.\rafa Pt is 8 week pregnant. \par  She tried to decrease lexapro to 5-10mg and felt very anxious. Pt started feeling better after she increased lexapro back to 12.5 mg. She decided to continue with meds.\rafa Pt also [place] to [facility] [place] with her 49 yo BF. She will see me 1 more time in 1 m and then her case will be closed. \rafa  Pt again asked a lot of questions about her meds and pregnancy. She is concerned that she tried to wean herself off lexapro and felt so bad. Risks of meds for a fetus were discussed in details.\rafa  Pt is currently in therapy with a private therapist. Denies SI/HI/AH/PI. Sleep and appetite are OK.\rafa Pt had labs done 2 weeks ago and she will see her PCP in 1 week. I gave her our fax #\par Education and supportive therapy were provided [de-identified] : Solo/tele visit. Pt has a lot of technical problems.\par Pt is 12 week pregnant. \par She had a test done for Down's syndrome and feels anxious waiting for the results to come back. Pt was comforted and skill therapy was provided. Pt has a private outside therapist. Pt is relocating to [place] on 6/27. I notified her that her case will be closed. She wanted to continue telepsych with me from [place]. I instructed pt to find a provider in [place]  ASAP.\par  She tried to decrease lexapro to 5-10mg and felt very anxious. Pt started feeling better after she increased lexapro back to 12.5 mg. She decided to continue with meds. (risks and benefits were discussed at length). Pt had labs done. I called her PCP at [phone] and spoke to the . They promised to fax results ASAP. \par  Pt is currently in therapy with a private therapist. Denies SI/HI/AH/PI. Sleep and appetite are OK.\par Pt denies feeling depressed. Sleep and appetite are OK. Denies SI/HI/AH/Pi. Denies GEORGE\par Education and supportive therapy were provided [Yes] : yes [No] : no [TextBox_32] : Patient reports times where she wished she was dead and wanted it to happen accidentally.  However, she denied intent to harm (denied ability "to do it myself").   [Yes > 3 months ago] : yes, > 3 months ago [Agitation/ severe anxiety] : agitation/severe anxiety [Anhedonia] : anhedonia [Recent loss] : recent loss [Other___] : [unfilled] [Identifies reasons for living] : identifies reasons for living [Future oriented] : future oriented [Fear of death or dying due to pain/suffering] : fear of death or dying due to pain/suffering [TextBox_52] : Patient reports at age [age] she engaged in self-injurious behavior of cutting wrist 2-3 times.  Denied intent to kill herself, but to only hurt and release stress [None Known] : none known [Engagement in treatment] : engagement in treatment

## 2023-06-22 NOTE — PAST MEDICAL HISTORY
[FreeTextEntry1] : Patient is a 30 year old, domiciled, employed, , female from [facility] who reports history of depression and anxiety dating back to age 11.  At this time, she admits to depressive characterized by uncontrollable tearful episodes and decreased appetite.  Parents sought outpatient mental health treatment with a psychologist for a year with positive effect.  She did not receive additional treatment until age 18 when she was psychiatrically hospitalized due to anxiety and depressive symptoms in context of Bird flu pandemic.  Did not receive any other treatment until 2017 when she received outpatient treatment at [facility].  Denied history of suicide/homicide attempts.  Acknowledged 2 to 3 incidents of self-injurious behavior by way of cutting wrist at age [age].  Also denied history of psychosis and acts of violence.

## 2023-06-22 NOTE — HISTORY OF PRESENT ILLNESS
[FreeTextEntry3] : Solo/tele visit.\rafa Pt is 8 week pregnant. \par  She tried to decrease lexapro to 5-10mg and felt very anxious. Pt started feeling better after she increased lexapro back to 12.5 mg. She decided to continue with meds.\rafa Pt also moves to [facility] with her 47 yo BF. She will see me 1 more time in 1 m and then her case will be closed. \rafa  Pt again asked a lot of questions about her meds and pregnancy. She is concerned that she tried to wean herself off lexapro and felt so bad. Risks of meds for a fetus were discussed in details.\rafa  Pt is currently in therapy with a private therapist. Denies SI/HI/AH/PI. Sleep and appetite are OK.\rafa Pt had labs done 2 weeks ago and she will see her PCP in 1 week. I gave her our fax #\par Education and supportive therapy were provided [de-identified] : Solo/tele visit. Pt has a lot of technical problems.\par Pt is 12 week pregnant. \par She had a test done for Down's syndrome and feels anxious waiting for the results to come back. Pt was comforted and skill therapy was provided. Pt has a private outside therapist. Pt is relocating to [place] on 6/27. I notified her that her case will be closed. She wanted to continue telepsych with me from [place]. I instructed pt to find a provider in [place]  ASAP.\par  She tried to decrease lexapro to 5-10mg and felt very anxious. Pt started feeling better after she increased lexapro back to 12.5 mg. She decided to continue with meds. (risks and benefits were discussed at length). Pt had labs done. I called her PCP at [phone] and spoke to the . They promised to fax results ASAP. \par  Pt is currently in therapy with a private therapist. Denies SI/HI/AH/PI. Sleep and appetite are OK.\par Pt denies feeling depressed. Sleep and appetite are OK. Denies SI/HI/AH/Pi. Denies GEORGE\par Education and supportive therapy were provided [Yes] : yes [No] : no [TextBox_32] : Patient reports times where she wished she was dead and wanted it to happen accidentally.  However, she denied intent to harm (denied ability "to do it myself").   [Yes > 3 months ago] : yes, > 3 months ago [Agitation/ severe anxiety] : agitation/severe anxiety [Anhedonia] : anhedonia [Recent loss] : recent loss [Other___] : [unfilled] [Identifies reasons for living] : identifies reasons for living [Future oriented] : future oriented [Fear of death or dying due to pain/suffering] : fear of death or dying due to pain/suffering [TextBox_52] : Patient reports at age [age] she engaged in self-injurious behavior of cutting wrist 2-3 times.  Denied intent to kill herself, but to only hurt and release stress [None Known] : none known [Engagement in treatment] : engagement in treatment

## 2023-06-22 NOTE — HISTORY OF PRESENT ILLNESS
[FreeTextEntry3] : Solo/tele visit.\rafa Pt is 8 week pregnant. \par  She tried to decrease lexapro to 5-10mg and felt very anxious. Pt started feeling better after she increased lexapro back to 12.5 mg. She decided to continue with meds.\rafa Pt also moves to [facility] with her 47 yo BF. She will see me 1 more time in 1 m and then her case will be closed. \rafa  Pt again asked a lot of questions about her meds and pregnancy. She is concerned that she tried to wean herself off lexapro and felt so bad. Risks of meds for a fetus were discussed in details.\rafa  Pt is currently in therapy with a private therapist. Denies SI/HI/AH/PI. Sleep and appetite are OK.\rafa Pt had labs done 2 weeks ago and she will see her PCP in 1 week. I gave her our fax #\par Education and supportive therapy were provided [de-identified] : Solo/tele visit. Pt has a lot of technical problems.\par Pt is 12 week pregnant. \par She had a test done for Down's syndrome and feels anxious waiting for the results to come back. Pt was comforted and skill therapy was provided. Pt has a private outside therapist. Pt is relocating to [place] on 6/27. I notified her that her case will be closed. She wanted to continue telepsych with me from [place]. I instructed pt to find a provider in [place]  ASAP.\par  She tried to decrease lexapro to 5-10mg and felt very anxious. Pt started feeling better after she increased lexapro back to 12.5 mg. She decided to continue with meds. (risks and benefits were discussed at length). Pt had labs done. I called her PCP at [phone] and spoke to the . They promised to fax results ASAP. \par  Pt is currently in therapy with a private therapist. Denies SI/HI/AH/PI. Sleep and appetite are OK.\par Pt denies feeling depressed. Sleep and appetite are OK. Denies SI/HI/AH/Pi. Denies GEORGE\par Education and supportive therapy were provided [Yes] : yes [No] : no [TextBox_32] : Patient reports times where she wished she was dead and wanted it to happen accidentally.  However, she denied intent to harm (denied ability "to do it myself").   [Yes > 3 months ago] : yes, > 3 months ago [Agitation/ severe anxiety] : agitation/severe anxiety [Anhedonia] : anhedonia [Recent loss] : recent loss [Other___] : [unfilled] [Identifies reasons for living] : identifies reasons for living [Future oriented] : future oriented [Fear of death or dying due to pain/suffering] : fear of death or dying due to pain/suffering [TextBox_52] : Patient reports at age [age] she engaged in self-injurious behavior of cutting wrist 2-3 times.  Denied intent to kill herself, but to only hurt and release stress [None Known] : none known [Engagement in treatment] : engagement in treatment

## 2023-06-22 NOTE — SOCIAL HISTORY
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 \par [X] No \par [ ] Yes - Details: [ ]  \par \par  \par \par Legal Involvement history  \par Does the individual have a history of or current involvement with the legal system?  \par [X] No \par [ ] Yes - Details: [ ]  \par \par  \par \par  Services  \par Have you ever served in the ?  \par [ X ] No \par [ ] Yes - Details: [ ]  \par \par  \par Spiritual Assessment Tool - FICA \par F. What is your gigi or belief? Patient believes in energy.  “Something exists, but we don’t know” \par \par Do you consider yourself spiritual or Scientologist? Spiritual  \par Is there something you believe in that gives meaning to your life? "Belief in success” \par \par I: Is it important in your life? Yes, very \par What influence does it have on how you take care of yourself? Drives her to engage in activities to be successful   \par \par How have your beliefs influenced your behavior during this illness? What role do your beliefs play in regaining your health? She does not believe it has  \par \par C. Are you part of a spiritual or Scientologist community? No  \par Is this of support to you and how?  N/A \par \par Is there a person or group of people you really love or who are really important to you? Boyfriend and family  \par \par H. We have been discussing your belief and supports. What else gives you internal support?  “When someone loves me unconditionally” \par \par What are your sources of hope, strength, comfort and peace?  Love from others \par \par What do you hold on to during difficult times? Thoughts that things can be better  \par \par What sustains you and keeps you going? Hope  \par \par A. How would you like me, your healthcare provider, to address these issues in your healthcare? Does not want it addressed  \par \par   [Yes] : yes [None Known] : none known [No Known Use] : no known use [TextBox_7] : Patient reports she drinks 1-2 days a week where she consumes 1-2 glasses of wine with friends.  Denied problematic  [TextBox_9] : Denied [TextBox_11] : Denied

## 2023-06-22 NOTE — PAST MEDICAL HISTORY
[FreeTextEntry1] : Patient is a 30 year old, domiciled, employed, , female from [facility] who reports history of depression and anxiety dating back to age 11.  At this time, she admits to depressive characterized by uncontrollable tearful episodes and decreased appetite.  Parents sought outpatient mental health treatment with a psychologist for a year with positive effect.  She did not receive additional treatment until age 18 when she was psychiatrically hospitalized due to anxiety and depressive symptoms in context of Bird flu pandemic.  Did not receive any other treatment until 2017 when she received outpatient treatment at [facility].  Denied history of suicide/homicide attempts.  Acknowledged 2 to 3 incidents of self-injurious behavior by way of cutting wrist at age [age].  Also denied history of psychosis and acts of violence.

## 2023-06-22 NOTE — SOCIAL HISTORY
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 \par [X] No \par [ ] Yes - Details: [ ]  \par \par  \par \par Legal Involvement history  \par Does the individual have a history of or current involvement with the legal system?  \par [X] No \par [ ] Yes - Details: [ ]  \par \par  \par \par  Services  \par Have you ever served in the ?  \par [ X ] No \par [ ] Yes - Details: [ ]  \par \par  \par Spiritual Assessment Tool - FICA \par F. What is your gigi or belief? Patient believes in energy.  “Something exists, but we don’t know” \par \par Do you consider yourself spiritual or Druze? Spiritual  \par Is there something you believe in that gives meaning to your life? "Belief in success” \par \par I: Is it important in your life? Yes, very \par What influence does it have on how you take care of yourself? Drives her to engage in activities to be successful   \par \par How have your beliefs influenced your behavior during this illness? What role do your beliefs play in regaining your health? She does not believe it has  \par \par C. Are you part of a spiritual or Druze community? No  \par Is this of support to you and how?  N/A \par \par Is there a person or group of people you really love or who are really important to you? Boyfriend and family  \par \par H. We have been discussing your belief and supports. What else gives you internal support?  “When someone loves me unconditionally” \par \par What are your sources of hope, strength, comfort and peace?  Love from others \par \par What do you hold on to during difficult times? Thoughts that things can be better  \par \par What sustains you and keeps you going? Hope  \par \par A. How would you like me, your healthcare provider, to address these issues in your healthcare? Does not want it addressed  \par \par   [Yes] : yes [None Known] : none known [No Known Use] : no known use [TextBox_7] : Patient reports she drinks 1-2 days a week where she consumes 1-2 glasses of wine with friends.  Denied problematic  [TextBox_9] : Denied [TextBox_11] : Denied

## 2023-06-23 ENCOUNTER — APPOINTMENT (OUTPATIENT)
Dept: ANTEPARTUM | Facility: CLINIC | Age: 33
End: 2023-06-23
Payer: MEDICAID

## 2023-06-23 ENCOUNTER — ASOB RESULT (OUTPATIENT)
Age: 33
End: 2023-06-23

## 2023-06-23 DIAGNOSIS — F33.1 MAJOR DEPRESSIVE DISORDER, RECURRENT, MODERATE: ICD-10-CM

## 2023-06-23 PROCEDURE — 76813 OB US NUCHAL MEAS 1 GEST: CPT | Mod: 59

## 2023-06-23 PROCEDURE — 76801 OB US < 14 WKS SINGLE FETUS: CPT

## 2023-06-27 NOTE — REASON FOR VISIT
[Follow-Up Visit] : a follow-up visit [Home] : at home, [unfilled] , at the time of the visit. [Medical Office: ([facility])___] : at the medical office located in  [Evaluation] : evaluation of [Patient relocated] : Patient relocated [FreeTextEntry8] : 6/27/2023 [FreeTextEntry1] : Pt is pregnant. She wished to continue lexapro [FreeTextEntry2] : anxiety

## 2023-06-27 NOTE — DISCUSSION/SUMMARY
[FreeTextEntry1] : \par Pt is 12 week pregnant. \par  Pt has a private outside therapist. Pt is relocating to [place] on 6/27. I notified her that her case will be closed. She wanted to continue telepsych with me from [place]. I instructed pt to find a provider in [place]  ASAP.\par  She tried to decrease lexapro to 5-10mg and felt very anxious. Pt started feeling better after she increased lexapro back to 12.5 mg. She decided to continue with meds. (risks and benefits were discussed at length). Pt had labs done. I called her PCP at [phone] and spoke to the . They promised to fax results ASAP. \par  Pt is currently in therapy with a private therapist. Denies SI/HI/AH/PI. Sleep and appetite are OK.\par Pt denies feeling depressed. Sleep and appetite are OK. Denies SI/HI/AH/Pi. 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 She is looking for a provider up there\par

## 2023-06-27 NOTE — REASON FOR VISIT
[Follow-Up Visit] : a follow-up visit [Home] : at home, [unfilled] , at the time of the visit. [Medical Office: ([facility])___] : at the medical office located in  [Evaluation] : evaluation of [Patient relocated] : Patient relocated [FreeTextEntry8] : 6/27/2023 [FreeTextEntry1] : Pt is pregnant. She wished to continue lexapro [FreeTextEntry2] : anxiety

## 2023-06-27 NOTE — DISCUSSION/SUMMARY
[FreeTextEntry1] : \par Pt is 12 week pregnant. \par  Pt has a private outside therapist. Pt is relocating to [place] on 6/27. I notified her that her case will be closed. She wanted to continue telepsych with me from [place]. I instructed pt to find a provider in [place]  ASAP.\par  She tried to decrease lexapro to 5-10mg and felt very anxious. Pt started feeling better after she increased lexapro back to 12.5 mg. She decided to continue with meds. (risks and benefits were discussed at length). Pt had labs done. I called her PCP at [phone] and spoke to the . They promised to fax results ASAP. \par  Pt is currently in therapy with a private therapist. Denies SI/HI/AH/PI. Sleep and appetite are OK.\par Pt denies feeling depressed. Sleep and appetite are OK. Denies SI/HI/AH/Pi. 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 She is looking for a provider up there\par

## 2023-06-27 NOTE — DISCUSSION/SUMMARY
[FreeTextEntry1] : \par Pt is 12 week pregnant. \par  Pt has a private outside therapist. Pt is relocating to [place] on 6/27. I notified her that her case will be closed. She wanted to continue telepsych with me from [place]. I instructed pt to find a provider in [place]  ASAP.\par  She tried to decrease lexapro to 5-10mg and felt very anxious. Pt started feeling better after she increased lexapro back to 12.5 mg. She decided to continue with meds. (risks and benefits were discussed at length). Pt had labs done. I called her PCP at [phone] and spoke to the . They promised to fax results ASAP. \par  Pt is currently in therapy with a private therapist. Denies SI/HI/AH/PI. Sleep and appetite are OK.\par Pt denies feeling depressed. Sleep and appetite are OK. Denies SI/HI/AH/Pi. 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 She is looking for a provider up there\par

## 2023-06-27 NOTE — REASON FOR VISIT
[Follow-Up Visit] : a follow-up visit [Home] : at home, [unfilled] , at the time of the visit. [Medical Office: ([facility])___] : at the medical office located in  [Evaluation] : evaluation of [Patient relocated] : Patient relocated [FreeTextEntry8] : 6/27/2023 [FreeTextEntry1] : Pt is pregnant. She wished to continue lexapro [FreeTextEntry2] : anxiety